# Patient Record
Sex: FEMALE | Race: WHITE | NOT HISPANIC OR LATINO | Employment: STUDENT | RURAL
[De-identification: names, ages, dates, MRNs, and addresses within clinical notes are randomized per-mention and may not be internally consistent; named-entity substitution may affect disease eponyms.]

---
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
## 2023-08-22 ENCOUNTER — OFFICE VISIT (OUTPATIENT)
Dept: FAMILY MEDICINE CLINIC | Facility: CLINIC | Age: 18
End: 2023-08-22
Payer: COMMERCIAL

## 2023-08-22 VITALS
DIASTOLIC BLOOD PRESSURE: 70 MMHG | SYSTOLIC BLOOD PRESSURE: 122 MMHG | WEIGHT: 217 LBS | HEIGHT: 67 IN | OXYGEN SATURATION: 98 % | HEART RATE: 52 BPM | BODY MASS INDEX: 34.06 KG/M2

## 2023-08-22 DIAGNOSIS — R10.2 PELVIC PAIN: Primary | ICD-10-CM

## 2023-08-22 DIAGNOSIS — R10.13 EPIGASTRIC PAIN: ICD-10-CM

## 2023-08-22 DIAGNOSIS — H66.91 ACUTE OTITIS MEDIA, RIGHT: ICD-10-CM

## 2023-08-22 PROCEDURE — 99213 OFFICE O/P EST LOW 20 MIN: CPT | Performed by: STUDENT IN AN ORGANIZED HEALTH CARE EDUCATION/TRAINING PROGRAM

## 2023-08-22 RX ORDER — AMOXICILLIN 875 MG/1
875 TABLET, COATED ORAL 2 TIMES DAILY
Qty: 20 TABLET | Refills: 0 | Status: SHIPPED | OUTPATIENT
Start: 2023-08-22 | End: 2023-09-01

## 2023-08-22 RX ORDER — OMEPRAZOLE 40 MG/1
40 CAPSULE, DELAYED RELEASE ORAL DAILY
Qty: 90 CAPSULE | Refills: 1 | Status: SHIPPED | OUTPATIENT
Start: 2023-08-22

## 2023-08-22 NOTE — PROGRESS NOTES
"Chief Complaint  Abdominal Pain (Lower abdominal pain, nausea and vomiting x 1 month) and Earache    Subjective          Pallavi Oliver presents to [facility]  History of Present Illness    Patient states that for the past month or so she has been having more trouble with her stomach. She states that her pain is in the low abdomen as well as in the epigastric region. She states that she is sexually active and her last period was 8/15/23. She states that she is not actively preventing pregnancy at this time. She has not tried anything over the counter to help with her symptoms.    She also thinks that she has been  having trouble with her ear recently. She states that it has been bothering her since Friday. She has no other symptoms that she is aware of. No exposure to illness that she is aware of.           Objective   Vital Signs:   /70 (BP Location: Left arm, Patient Position: Sitting, Cuff Size: Adult)   Pulse 52   Ht 170.2 cm (67\")   Wt 98.4 kg (217 lb)   SpO2 98%   BMI 33.99 kg/mý     Body mass index is 33.99 kg/mý.    Review of Systems    Past History:  Medical History: has no past medical history on file.   Surgical History: has no past surgical history on file.   Family History: family history includes ADD / ADHD in an other family member; Asthma in an other family member; Hyperlipidemia in an other family member; Hypertension in an other family member; Migraines in her paternal grandmother.   Social History: reports that she has never smoked. She has never used smokeless tobacco. Alcohol use questions deferred to the physician. Drug use questions deferred to the physician.      Current Outpatient Medications:     amoxicillin (AMOXIL) 875 MG tablet, Take 1 tablet by mouth 2 (Two) Times a Day for 10 days., Disp: 20 tablet, Rfl: 0    omeprazole (priLOSEC) 40 MG capsule, Take 1 capsule by mouth Daily., Disp: 90 capsule, Rfl: 1    Allergies: Patient has no known " allergies.    Physical Exam  Constitutional:       General: She is not in acute distress.     Appearance: She is not ill-appearing or toxic-appearing.   HENT:      Head: Normocephalic and atraumatic.      Ears:      Comments: Otitis media of the right ear  Cardiovascular:      Rate and Rhythm: Normal rate and regular rhythm.      Heart sounds: No murmur heard.  Pulmonary:      Effort: Pulmonary effort is normal. No respiratory distress.   Abdominal:      Tenderness: There is no guarding or rebound.      Comments: Generalized abdominal pain, blood abnormality in left lower quadrant overlying ovarian area.   Neurological:      General: No focal deficit present.      Mental Status: She is alert and oriented to person, place, and time.   Psychiatric:         Mood and Affect: Mood normal.         Thought Content: Thought content normal.        Result Review :                   Assessment and Plan    Diagnoses and all orders for this visit:    1. Pelvic pain (Primary)  -     US Pelvis Complete; Future    2. Epigastric pain  -     omeprazole (priLOSEC) 40 MG capsule; Take 1 capsule by mouth Daily.  Dispense: 90 capsule; Refill: 1    3. Acute otitis media, right  -     amoxicillin (AMOXIL) 875 MG tablet; Take 1 tablet by mouth 2 (Two) Times a Day for 10 days.  Dispense: 20 tablet; Refill: 0    Patient with possible ovarian cyst.  Will send for ultrasound to evaluate this further.    We will start omeprazole to help with nausea and vomiting first thing in the morning which sound to be related to increased acid and reflux.    Amoxicillin for the otitis media.  Call with any new or worsening symptoms.    Follow Up   No follow-ups on file.  Patient was given instructions and counseling regarding her condition or for health maintenance advice. Please see specific information pulled into the AVS if appropriate.     Linda Valentin, DO

## 2023-08-31 ENCOUNTER — HOSPITAL ENCOUNTER (OUTPATIENT)
Dept: ULTRASOUND IMAGING | Facility: HOSPITAL | Age: 18
Discharge: HOME | End: 2023-08-31
Admitting: STUDENT IN AN ORGANIZED HEALTH CARE EDUCATION/TRAINING PROGRAM
Payer: COMMERCIAL

## 2023-08-31 DIAGNOSIS — R10.2 PELVIC PAIN: ICD-10-CM

## 2023-08-31 PROCEDURE — 76856 US EXAM PELVIC COMPLETE: CPT

## 2023-09-06 ENCOUNTER — PATIENT MESSAGE (OUTPATIENT)
Dept: FAMILY MEDICINE CLINIC | Facility: CLINIC | Age: 18
End: 2023-09-06
Payer: COMMERCIAL

## 2023-09-13 ENCOUNTER — OFFICE VISIT (OUTPATIENT)
Dept: FAMILY MEDICINE CLINIC | Facility: CLINIC | Age: 18
End: 2023-09-13
Payer: COMMERCIAL

## 2023-09-13 VITALS
WEIGHT: 215 LBS | HEIGHT: 67 IN | BODY MASS INDEX: 33.74 KG/M2 | SYSTOLIC BLOOD PRESSURE: 124 MMHG | OXYGEN SATURATION: 100 % | HEART RATE: 70 BPM | DIASTOLIC BLOOD PRESSURE: 70 MMHG

## 2023-09-13 DIAGNOSIS — R53.83 OTHER FATIGUE: ICD-10-CM

## 2023-09-13 DIAGNOSIS — K21.9 GASTROESOPHAGEAL REFLUX DISEASE, UNSPECIFIED WHETHER ESOPHAGITIS PRESENT: ICD-10-CM

## 2023-09-13 DIAGNOSIS — K92.1 MELENA: Primary | ICD-10-CM

## 2023-09-13 PROCEDURE — 99213 OFFICE O/P EST LOW 20 MIN: CPT | Performed by: STUDENT IN AN ORGANIZED HEALTH CARE EDUCATION/TRAINING PROGRAM

## 2023-09-13 NOTE — PROGRESS NOTES
"Chief Complaint  Vomiting and Abdominal Pain    Subjective          Pallavi Carmona presents to [facility]  History of Present Illness    Patient presents the office today for follow-up on abdominal pain.  She states that it did initially get better, and she does not have as much difficulty with morning nausea and vomiting that she did previously, but she now has been having persistent left lower quadrant abdominal pain which is colicky in nature, and has been having episodes of blood in stool as well as abnormal colors to her stool and mucus.  She states that she has never had anything like this before.  She does not have any known family history of irritable bowel disease.    Objective   Vital Signs:   /70   Pulse 70   Ht 170.2 cm (67\")   Wt 97.5 kg (215 lb)   SpO2 100%   BMI 33.67 kg/m²     Body mass index is 33.67 kg/m².    Review of Systems    Past History:  Medical History: has no past medical history on file.   Surgical History: has no past surgical history on file.   Family History: family history includes ADD / ADHD in an other family member; Asthma in an other family member; Hyperlipidemia in an other family member; Hypertension in an other family member; Migraines in her paternal grandmother.   Social History: reports that she has never smoked. She has never used smokeless tobacco. Alcohol use questions deferred to the physician. Drug use questions deferred to the physician.      Current Outpatient Medications:     omeprazole (priLOSEC) 40 MG capsule, Take 1 capsule by mouth Daily., Disp: 90 capsule, Rfl: 1    Allergies: Patient has no known allergies.    Physical Exam  Constitutional:       General: She is not in acute distress.     Appearance: She is not ill-appearing or toxic-appearing.   HENT:      Head: Normocephalic and atraumatic.   Cardiovascular:      Rate and Rhythm: Normal rate and regular rhythm.      Heart sounds: No murmur heard.  Pulmonary:      Effort: " Pulmonary effort is normal. No respiratory distress.   Abdominal:      Tenderness: There is abdominal tenderness (Left lower quadrant).   Neurological:      General: No focal deficit present.      Mental Status: She is alert and oriented to person, place, and time.   Psychiatric:         Mood and Affect: Mood normal.         Thought Content: Thought content normal.        Result Review :                   Assessment and Plan    Diagnoses and all orders for this visit:    1. Melena (Primary)  -     Ambulatory Referral to Gastroenterology  -     Comprehensive Metabolic Panel  -     CBC & Differential    2. Gastroesophageal reflux disease, unspecified whether esophagitis present  -     Ambulatory Referral to Gastroenterology  -     TSH  -     T4, Free    3. Other fatigue  -     Comprehensive Metabolic Panel  -     CBC & Differential  -     TSH  -     T4, Free    Recorded today and will contact patient with results available.    Because of the change abdominal pain and possible melena as well as mucus in stool we will send to GI for further evaluation.  He is agreeable to this.  Continue omeprazole at this time.    Follow Up   No follow-ups on file.  Patient was given instructions and counseling regarding her condition or for health maintenance advice. Please see specific information pulled into the AVS if appropriate.     Linda Valentin, DO

## 2023-09-14 LAB
ALBUMIN SERPL-MCNC: 4.7 G/DL (ref 4–5)
ALBUMIN/GLOB SERPL: 1.7 {RATIO} (ref 1.2–2.2)
ALP SERPL-CCNC: 94 IU/L (ref 42–106)
ALT SERPL-CCNC: 17 IU/L (ref 0–32)
AST SERPL-CCNC: 15 IU/L (ref 0–40)
BASOPHILS # BLD AUTO: 0.1 X10E3/UL (ref 0–0.2)
BASOPHILS NFR BLD AUTO: 1 %
BILIRUB SERPL-MCNC: 0.3 MG/DL (ref 0–1.2)
BUN SERPL-MCNC: 8 MG/DL (ref 6–20)
BUN/CREAT SERPL: 11 (ref 9–23)
CALCIUM SERPL-MCNC: 9.3 MG/DL (ref 8.7–10.2)
CHLORIDE SERPL-SCNC: 103 MMOL/L (ref 96–106)
CO2 SERPL-SCNC: 17 MMOL/L (ref 20–29)
CREAT SERPL-MCNC: 0.74 MG/DL (ref 0.57–1)
EGFRCR SERPLBLD CKD-EPI 2021: 119 ML/MIN/1.73
EOSINOPHIL # BLD AUTO: 0.1 X10E3/UL (ref 0–0.4)
EOSINOPHIL NFR BLD AUTO: 1 %
ERYTHROCYTE [DISTWIDTH] IN BLOOD BY AUTOMATED COUNT: 13.2 % (ref 11.7–15.4)
GLOBULIN SER CALC-MCNC: 2.8 G/DL (ref 1.5–4.5)
GLUCOSE SERPL-MCNC: 99 MG/DL (ref 70–99)
HCT VFR BLD AUTO: 41.9 % (ref 34–46.6)
HGB BLD-MCNC: 13.6 G/DL (ref 11.1–15.9)
IMM GRANULOCYTES # BLD AUTO: 0 X10E3/UL (ref 0–0.1)
IMM GRANULOCYTES NFR BLD AUTO: 0 %
LYMPHOCYTES # BLD AUTO: 2.2 X10E3/UL (ref 0.7–3.1)
LYMPHOCYTES NFR BLD AUTO: 30 %
MCH RBC QN AUTO: 27.9 PG (ref 26.6–33)
MCHC RBC AUTO-ENTMCNC: 32.5 G/DL (ref 31.5–35.7)
MCV RBC AUTO: 86 FL (ref 79–97)
MONOCYTES # BLD AUTO: 0.6 X10E3/UL (ref 0.1–0.9)
MONOCYTES NFR BLD AUTO: 8 %
NEUTROPHILS # BLD AUTO: 4.4 X10E3/UL (ref 1.4–7)
NEUTROPHILS NFR BLD AUTO: 60 %
PLATELET # BLD AUTO: 345 X10E3/UL (ref 150–450)
POTASSIUM SERPL-SCNC: 4.6 MMOL/L (ref 3.5–5.2)
PROT SERPL-MCNC: 7.5 G/DL (ref 6–8.5)
RBC # BLD AUTO: 4.88 X10E6/UL (ref 3.77–5.28)
SODIUM SERPL-SCNC: 141 MMOL/L (ref 134–144)
T4 FREE SERPL-MCNC: 1.26 NG/DL (ref 0.82–1.77)
TSH SERPL DL<=0.005 MIU/L-ACNC: 0.75 UIU/ML (ref 0.45–4.5)
WBC # BLD AUTO: 7.3 X10E3/UL (ref 3.4–10.8)

## 2023-09-25 ENCOUNTER — OFFICE VISIT (OUTPATIENT)
Dept: GASTROENTEROLOGY | Facility: CLINIC | Age: 18
End: 2023-09-25

## 2023-09-25 VITALS
TEMPERATURE: 98.4 F | DIASTOLIC BLOOD PRESSURE: 82 MMHG | RESPIRATION RATE: 18 BRPM | SYSTOLIC BLOOD PRESSURE: 140 MMHG | HEART RATE: 78 BPM | OXYGEN SATURATION: 99 % | BODY MASS INDEX: 34.06 KG/M2 | WEIGHT: 217 LBS | HEIGHT: 67 IN

## 2023-09-25 DIAGNOSIS — R10.31 RIGHT LOWER QUADRANT PAIN: ICD-10-CM

## 2023-09-25 DIAGNOSIS — R10.32 LEFT LOWER QUADRANT PAIN: ICD-10-CM

## 2023-09-25 DIAGNOSIS — K65.1 RIGHT UPPER QUADRANT ABDOMINAL ABSCESS: ICD-10-CM

## 2023-09-25 DIAGNOSIS — R19.4 CHANGE IN BOWEL HABITS: ICD-10-CM

## 2023-09-25 DIAGNOSIS — K62.5 RECTAL BLEEDING: Primary | ICD-10-CM

## 2023-09-25 PROCEDURE — 99204 OFFICE O/P NEW MOD 45 MIN: CPT | Performed by: INTERNAL MEDICINE

## 2023-09-25 NOTE — PROGRESS NOTES
New Patient Consultation     Patient Name: Pallavi Carmona  : 2005   MRN: [id number]     Chief Complaint   Patient presents with    Diarrhea    RLQ Pain    Nausea    Black or Bloody Stool    Occasion right sided upper pain    History of Present Illness: Pallavi Carmona is a 18 y.o. female, PMH includes no significant hx, who is here today for a Gastroenterology Consultation for bloody stool and lower quadrant pain;  Patient is meeting me for the first time.  Patient had pelvic ultrasound that was negative.  She was started  on omeprazole for possible n/v;  Patient did complete course of amoxicillin for otitis media August as well with PCP.  Patient completed the antibiotics.      Patient on prilosec and denies any further vomiting.      Patient has about 5 type 6 bristol stools.  This is very unusual for the patient.  Patient feels like she has to go constantly.    Patient reports epigastric and lower abdominal pain.  She has reported blood in her stool.  Per PCP note she had melena.    Labs were drawn and she is not anemic.    She has been having symptoms for about 1 month.    Patient reports prilosec.  This has helped.    Patient started having blood in stool three weeks ago.  Patient was light red.  Light red  color mixed in stool after defecation.  Patient had abdominal pain after passing the stool. Blood not with every BM.  Patient sees blood in stool every other day.      Patient has pain on the right lower quadrant.  Patient reports comes and goes.  Patient reports with defecation has more pain on the left side.      Patient reports pain worse with eating.  20 to 30 minutes has to have a bowel movement with urgency.  Patient denies incontinence.  Patient denies passage of mucous.      Baseline, good appetite and no GI troubles.      Mild nausea in morning and no vomiting.    She denies daily NSAIDS.  Patient reports issues are separate from migraines    Patient denies personal or FHx of PUD, H  Pylori, gastritis, pancreatitis, colitis, Celiac disease, UC, Crohn's disease, IBS, colon or gastric cancers. Pt denies EtOH, tobacco, illicit substance or NSAID use.    Last EGD: none  Last Colon: none    Subjective      Review of Systems   Constitutional:  Positive for appetite change.   HENT: Negative.     Eyes: Negative.    Respiratory: Negative.     Cardiovascular: Negative.    Gastrointestinal:  Positive for blood in stool and diarrhea.   Endocrine: Negative.    Genitourinary: Negative.    Musculoskeletal: Negative.    Allergic/Immunologic: Negative.    Neurological: Negative.    Hematological: Negative.    Psychiatric/Behavioral: Negative.       No past medical history on file.    No past surgical history on file.    Family History   Problem Relation Age of Onset    Migraines Paternal Grandmother     Asthma Other     Hypertension Other     Hyperlipidemia Other     ADD / ADHD Other        Social History     Socioeconomic History    Marital status: Single   Tobacco Use    Smoking status: Never    Smokeless tobacco: Never   Vaping Use    Vaping Use: Every day   Substance and Sexual Activity    Alcohol use: Defer    Drug use: Defer    Sexual activity: Defer       Social History     Substance and Sexual Activity   Alcohol Use Defer     Social History     Tobacco Use   Smoking Status Never   Smokeless Tobacco Never     Has 2 and 3 years old at [facility]    Current Outpatient Medications:     omeprazole (priLOSEC) 40 MG capsule, Take 1 capsule by mouth Daily., Disp: 90 capsule, Rfl: 1    No Known Allergies    Objective     Physical Exam:  There were no vitals filed for this visit.  There is no height or weight on file to calculate BMI.     Physical Exam  Constitutional:       Appearance: Normal appearance.   HENT:      Head: Normocephalic.      Nose: Nose normal.   Cardiovascular:      Rate and Rhythm: Normal rate.   Pulmonary:      Effort: Pulmonary effort is normal.      Breath sounds: Normal breath sounds.    Abdominal:      General: Abdomen is flat. Bowel sounds are normal.   Musculoskeletal:         General: Normal range of motion.      Cervical back: Normal range of motion.   Skin:     General: Skin is warm.   Neurological:      General: No focal deficit present.      Mental Status: She is alert. Mental status is at baseline.   Psychiatric:         Mood and Affect: Mood normal.       Assessment / Plan      1. Rectal bleeding  Intermittent, differential include colitis, mass, internal hemorrhoids;  noted NO anemia.  Patient hemodynamically stable.  Noted issues worse with eating.  Reviewed pelvic ultrasound and labs by referring physician  - Ambulatory referral for Screening EGD  - Ambulatory Referral For Screening Colonoscopy    2. Left lower quadrant pain    - Ambulatory referral for Screening EGD  - Ambulatory Referral For Screening Colonoscopy    3. Right lower quadrant pain  - Ambulatory Referral For Screening Colonoscopy    4. Right upper quadrant abdominal abscess  - US Abdomen Complete; Future  - Consider GB issues but again the bleeding inconsistent with this.    5. Change in bowel habits  The change predates the use of antibiotics.  Patient may have colitis or internal hemorrhoids.    EGD and colonoscopy    Case discussed with patient and mother stated much of her childhood history.      Follow Up:  after procedures and ultrasound      Plan of care reviewed with the patient at the conclusion of today's visit.  Education was provided regarding diagnosis, management, and any prescribed or recommended OTC medications.  Patient verbalized understanding of and agreement with management plan.     NOTE TO PATIENT: The 21st Century Cures Act makes medical notes like these available to patients in the interest of transparency. However, be advised this is a medical document. It is intended as peer to peer communication. It is written in medical language and may contain abbreviations or verbiage that are unfamiliar.  It may appear blunt or direct. Medical documents are intended to carry relevant information, facts as evident, and the clinical opinion of the practitioner.     Aan Styles MD   [facility]    Part of this note may be an electronic transcription/translation of spoken language to printed text using the Dragon Dictation System.

## 2023-10-09 ENCOUNTER — TELEPHONE (OUTPATIENT)
Dept: GASTROENTEROLOGY | Facility: CLINIC | Age: 18
End: 2023-10-09
Payer: COMMERCIAL

## 2023-10-09 NOTE — TELEPHONE ENCOUNTER
Caller: Pallavi Carmona    Relationship to patient: Self    Best call back number: [phone]     Type of visit: COLONOSCOPY/EGD     Requested date: AFTER  10/21     If rescheduling, when is the original appointment: 10/13     Additional notes: PATIENT IS SCHEDULED FOR A PROCEDURE WITH DR LEGGETT ON 10/13 THEY WOULD LIKE TO RESCHEDULE FOR AFTER 10/21. PLEASE CALL PATIENT.

## 2023-10-18 DIAGNOSIS — R10.84 GENERALIZED ABDOMINAL PAIN: Primary | ICD-10-CM

## 2024-01-05 ENCOUNTER — HOSPITAL ENCOUNTER (OUTPATIENT)
Dept: NUCLEAR MEDICINE | Facility: HOSPITAL | Age: 19
Discharge: HOME | End: 2024-01-05
Payer: COMMERCIAL

## 2024-01-05 VITALS — WEIGHT: 200 LBS | BODY MASS INDEX: 31.32 KG/M2

## 2024-01-05 DIAGNOSIS — R10.84 GENERALIZED ABDOMINAL PAIN: ICD-10-CM

## 2024-01-05 PROCEDURE — 0 TECHNETIUM TC 99M MEBROFENIN KIT: Performed by: INTERNAL MEDICINE

## 2024-01-05 PROCEDURE — 78227 HEPATOBIL SYST IMAGE W/DRUG: CPT

## 2024-01-05 PROCEDURE — A9537 TC99M MEBROFENIN: HCPCS | Performed by: INTERNAL MEDICINE

## 2024-01-05 PROCEDURE — 25010000002 SINCALIDE PER 5 MCG: Performed by: INTERNAL MEDICINE

## 2024-01-05 RX ORDER — SINCALIDE 5 UG/5ML
0.02 INJECTION, POWDER, LYOPHILIZED, FOR SOLUTION INTRAVENOUS ONCE
Status: COMPLETED | OUTPATIENT
Start: 2024-01-05 | End: 2024-01-05

## 2024-01-05 RX ORDER — KIT FOR THE PREPARATION OF TECHNETIUM TC 99M MEBROFENIN 45 MG/10ML
1 INJECTION, POWDER, LYOPHILIZED, FOR SOLUTION INTRAVENOUS
Status: COMPLETED | OUTPATIENT
Start: 2024-01-05 | End: 2024-01-05

## 2024-01-05 RX ADMIN — MEBROFENIN 1 DOSE: 45 INJECTION, POWDER, LYOPHILIZED, FOR SOLUTION INTRAVENOUS at 12:25

## 2024-01-05 RX ADMIN — SINCALIDE 1.8 MCG: 5 INJECTION, POWDER, LYOPHILIZED, FOR SOLUTION INTRAVENOUS at 13:42

## 2024-01-06 DIAGNOSIS — R10.11 RIGHT UPPER QUADRANT ABDOMINAL PAIN: Primary | ICD-10-CM

## 2024-01-30 ENCOUNTER — LAB REQUISITION (OUTPATIENT)
Dept: LAB | Facility: HOSPITAL | Age: 19
End: 2024-01-30
Payer: COMMERCIAL

## 2024-01-30 DIAGNOSIS — K82.8 BILIARY DYSKINESIA: Primary | ICD-10-CM

## 2024-01-30 DIAGNOSIS — K82.8 OTHER SPECIFIED DISEASES OF GALLBLADDER: ICD-10-CM

## 2024-01-30 PROCEDURE — 88304 TISSUE EXAM BY PATHOLOGIST: CPT | Performed by: STUDENT IN AN ORGANIZED HEALTH CARE EDUCATION/TRAINING PROGRAM

## 2024-01-30 RX ORDER — TRAMADOL HYDROCHLORIDE 50 MG/1
50 TABLET ORAL EVERY 6 HOURS PRN
Qty: 10 TABLET | Refills: 0 | Status: SHIPPED | OUTPATIENT
Start: 2024-01-30

## 2024-01-31 LAB
CYTO UR: NORMAL
LAB AP CASE REPORT: NORMAL
LAB AP CLINICAL INFORMATION: NORMAL
PATH REPORT.FINAL DX SPEC: NORMAL
PATH REPORT.GROSS SPEC: NORMAL

## 2025-04-14 ENCOUNTER — TELEPHONE (OUTPATIENT)
Dept: OBSTETRICS AND GYNECOLOGY | Facility: CLINIC | Age: 20
End: 2025-04-14

## 2025-04-14 NOTE — TELEPHONE ENCOUNTER
Hub staff attempted to follow warm transfer process and was unsuccessful     Caller: Pallavi Carmona    Relationship to patient: Self    Best call back number: [phone]; OK TO LVM     Patient is needing: NEW OB, NO PREF FOR PROVIDER. LMP 03/06/2025

## 2025-04-21 ENCOUNTER — TELEPHONE (OUTPATIENT)
Dept: OBSTETRICS AND GYNECOLOGY | Facility: CLINIC | Age: 20
End: 2025-04-21
Payer: COMMERCIAL

## 2025-04-21 NOTE — TELEPHONE ENCOUNTER
S/w pt she states she has been having nausea and mild vomiting and wanted to know if we could send her in something for this.     Patient states she has not tried unisom or vitamin b6 OTC.     I advised patient to try OTC unisom, vitamin b6, bland diet, small frequent meals, and if that does not help to CB for further evaluation.     She v/u     Patient denies: vomiting >5x per day, vaginal bleeding, HA's, vision changes, severe pelvic pain/cramping

## 2025-04-21 NOTE — TELEPHONE ENCOUNTER
Pt called and stated she has a new ob appt scheduled in office for early may but pt states that she is having extreme morning sickness and would like to know if we can send a script in for her

## 2025-04-30 DIAGNOSIS — Z34.90 EARLY STAGE OF PREGNANCY: Primary | ICD-10-CM

## 2025-05-02 ENCOUNTER — INITIAL PRENATAL (OUTPATIENT)
Dept: OBSTETRICS AND GYNECOLOGY | Facility: CLINIC | Age: 20
End: 2025-05-02
Payer: COMMERCIAL

## 2025-05-02 VITALS — WEIGHT: 187.6 LBS | DIASTOLIC BLOOD PRESSURE: 80 MMHG | SYSTOLIC BLOOD PRESSURE: 126 MMHG | BODY MASS INDEX: 29.38 KG/M2

## 2025-05-02 DIAGNOSIS — O26.891 PREGNANCY HEADACHE IN FIRST TRIMESTER: ICD-10-CM

## 2025-05-02 DIAGNOSIS — R51.9 PREGNANCY HEADACHE IN FIRST TRIMESTER: ICD-10-CM

## 2025-05-02 DIAGNOSIS — Z34.00 PRENATAL CARE, FIRST PREGNANCY, ANTEPARTUM: Primary | ICD-10-CM

## 2025-05-02 PROBLEM — O26.899 HEADACHE IN PREGNANCY: Status: ACTIVE | Noted: 2025-05-02

## 2025-05-02 RX ORDER — LANOLIN ALCOHOL/MO/W.PET/CERES
50 CREAM (GRAM) TOPICAL DAILY
COMMUNITY

## 2025-05-02 RX ORDER — METOCLOPRAMIDE 10 MG/1
10 TABLET ORAL EVERY 6 HOURS PRN
Qty: 90 TABLET | Refills: 1 | Status: SHIPPED | OUTPATIENT
Start: 2025-05-02

## 2025-05-02 RX ORDER — DIPHENHYDRAMINE HCL 25 MG
25 TABLET ORAL EVERY 6 HOURS PRN
Qty: 90 TABLET | Refills: 1 | Status: SHIPPED | OUTPATIENT
Start: 2025-05-02

## 2025-05-02 NOTE — PROGRESS NOTES
Initial ob visit     CC- Here for care of pregnancy        Pallavi Carmona is a 19 y.o. female, , who presents for her first obstetrical visit.  Patient's last menstrual period was 2025 (exact date).. Her HENNA is 2025, by Last Menstrual Period. Current GA is 8w1d.     She states she seen [facility] 2024 and she was told she had a chemical pregnancy at that time. She states they were going to test her for PCOS d/t facial hair, weight in her abdomen, and ovarian cyst. However, she never had the testing done. Works in childcare.    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     Initial positive test date : 25, UPT        Her periods are every 28 days.  Prior obstetric issues: none  Patient's past medical history is significant for:  none .  Family history of genetic issues (includes FOB): none  Prior infections concerning in pregnancy (Rash, fever in last 2 weeks): No  Varicella Hx - vaccinated  Prior testing for Cystic Fibrosis Carrier or Sickle Cell Trait- no.   Prepregnancy BMI - Body mass index is 29.38 kg/m².  History of STD: no  Hx of HSV for patient or partner: no  Ultrasound Today: yes    OB History    Para Term  AB Living   1        SAB IAB Ectopic Molar Multiple Live Births              # Outcome Date GA Lbr Ramsey/2nd Weight Sex Type Anes PTL Lv   1 Current                Additional Pertinent History   Last Pap : 2024-Last Pap- [facility]- WNL-per patient     Last Completed Pap Smear    This patient has no relevant Health Maintenance data.       History of abnormal Pap smear: no  Family history of uterine, colon, breast, or ovarian cancer: no  Feelings of Anxiety or Depression: no  Tobacco Usage?: No   Alcohol/Drug Use?: NO  Over the age of 35 at delivery: no  Genetic Screening: Rn will discuss  High Risk Moderate Risk Low Risk   History of preeclampsia or gestational hypertension- No  Multifetal gestation- No  Chronic hypertension-  No  Pregestational Diabetes- No  Kidney Disease- No  Autoimmune disease (I.e. lupus, antiphospholipid syndrome)- No Nulliparity- Yes  Obesity (BMI>30)- borderline  Family history of preeclampsia- No  Black race- No  Lower socio-economic status- No  Age 35+ - No  IVF- No  History of SGA Infant- No  >10 year interpregnancy interval- No Prior uncomplicated term delivery and absence of any risk factors- No   Total: 0 Total: 1-2 Total: 0      For this patient, low dose aspirin is indicated due to 2 or more moderate risk factors and aspirin 81mg has been prescribed.     PMH    Current Outpatient Medications:     doxylamine (UNISOM) 25 MG tablet, Take 1 tablet by mouth At Night As Needed for Sleep., Disp: , Rfl:     omeprazole (priLOSEC) 40 MG capsule, Take 1 capsule by mouth Daily., Disp: 90 capsule, Rfl: 1    vitamin B-6 (PYRIDOXINE) 50 MG tablet, Take 1 tablet by mouth Daily., Disp: , Rfl:     diphenhydrAMINE (Benadryl Allergy) 25 MG tablet, Take 1 tablet by mouth Every 6 (Six) Hours As Needed (headache [take with reglan])., Disp: 90 tablet, Rfl: 1    metoclopramide (Reglan) 10 MG tablet, Take 1 tablet by mouth Every 6 (Six) Hours As Needed (headache [take with benadryl])., Disp: 90 tablet, Rfl: 1     Past Medical History:   Diagnosis Date    Acid reflux     Migraine     Ovarian cyst         Past Surgical History:   Procedure Laterality Date    LAPAROSCOPIC CHOLECYSTECTOMY  01/30/2024    TONSILLECTOMY      TYMPANOPLASTY         Review of Systems   Review of Systems    Patient Reports:  none  Patient Denies:excessive nausea , excessive vomiting, and vaginal bleeding  All systems reviewed and otherwise normal.    I have reviewed and agree with the HPI, ROS, and historical information as entered above. Johnny Meyer MD     /80   Wt 85.1 kg (187 lb 9.6 oz)   LMP 03/06/2025 (Exact Date)   BMI 29.38 kg/m²     The additional following portions of the patient's history were reviewed and updated as appropriate:  allergies, current medications, past family history, past medical history, past social history, past surgical history, and problem list.    Physical Exam  General:  well developed; well nourished  no acute distress  mentation appropriate   Chest/Respiratory: unlabored   Heart:  not examined   Thyroid: not examined   Breasts:  Not performed.   Abdomen: Not performed.   Pelvis: Not performed.        Assessment and Plan    Problem List Items Addressed This Visit       Headache in pregnancy    Relevant Medications    diphenhydrAMINE (Benadryl Allergy) 25 MG tablet    metoclopramide (Reglan) 10 MG tablet     Other Visit Diagnoses         Prenatal care, first pregnancy, antepartum    -  Primary    Relevant Orders    Obstetric Panel    HIV-1 / O / 2 Ag / Antibody    Urine Culture - Urine, Urine, Clean Catch    Urinalysis With Microscopic - Urine, Clean Catch    Chlamydia trachomatis, Neisseria gonorrhoeae, PCR - Urine, Urine, Clean Catch    Urine Drug Screen - Urine, Clean Catch    Thyroid Cascade Profile    Parvovirus B19 Antibody, IgG    Varicella Zoster Antibody, IgG    Hemoglobin A1c            Pregnancy at 8w1d  Reviewed routine prenatal care with the office and educational materials given  Lab(s) Ordered  Discussed options for genetic testing including first trimester nuchal translucency screen, genetic disease carrier testing, quadruple screen, and NIPT  Medication(s) Ordered  Discontinue the use of all non-medicinal drugs and chemicals  Nausea/Vomiting - she does not desire medications at this time.  Discussed conservative ways to help with nausea.  Patient is on Prenatal vitamins  Activity recommendation : 150 minutes/week of moderate intensity aerobic activity unless we limit for bleeding, hypertension or other pregnancy complication   U/S ordered at follow up  Recommend limiting weight gain to 15 to 20 pounds in pregnancy.   Discussed carbohydrate control.   hgb A1C today  discussed baby aspirin from 12 weeks to  delivery for prevention of preeclampsia   Return in about 4 weeks (around 5/30/2025) for Prenatal Care.      Johnny Meyer MD  05/02/2025

## 2025-05-03 LAB
ABO GROUP BLD: ABNORMAL
APPEARANCE UR: ABNORMAL
BACTERIA #/AREA URNS HPF: ABNORMAL /[HPF]
BASOPHILS # BLD AUTO: 0 X10E3/UL (ref 0–0.2)
BASOPHILS NFR BLD AUTO: 0 %
BILIRUB UR QL STRIP: NEGATIVE
BLD GP AB SCN SERPL QL: NEGATIVE
CASTS URNS QL MICRO: ABNORMAL /LPF
COLOR UR: YELLOW
EOSINOPHIL # BLD AUTO: 0 X10E3/UL (ref 0–0.4)
EOSINOPHIL NFR BLD AUTO: 0 %
EPI CELLS #/AREA URNS HPF: >10 /HPF (ref 0–10)
ERYTHROCYTE [DISTWIDTH] IN BLOOD BY AUTOMATED COUNT: 12.7 % (ref 11.7–15.4)
GLUCOSE UR QL STRIP: NEGATIVE
HBV SURFACE AG SERPL QL IA: NEGATIVE
HCT VFR BLD AUTO: 40.7 % (ref 34–46.6)
HCV IGG SERPL QL IA: NON REACTIVE
HGB BLD-MCNC: 13.7 G/DL (ref 11.1–15.9)
HGB UR QL STRIP: NEGATIVE
HIV 1+2 AB+HIV1 P24 AG SERPL QL IA: NON REACTIVE
IMM GRANULOCYTES # BLD AUTO: 0 X10E3/UL (ref 0–0.1)
IMM GRANULOCYTES NFR BLD AUTO: 0 %
INTERPRETATION: NORMAL
KETONES UR QL STRIP: NEGATIVE
LEUKOCYTE ESTERASE UR QL STRIP: NEGATIVE
LYMPHOCYTES # BLD AUTO: 2.1 X10E3/UL (ref 0.7–3.1)
LYMPHOCYTES NFR BLD AUTO: 20 %
MCH RBC QN AUTO: 29.7 PG (ref 26.6–33)
MCHC RBC AUTO-ENTMCNC: 33.7 G/DL (ref 31.5–35.7)
MCV RBC AUTO: 88 FL (ref 79–97)
MICRO URNS: ABNORMAL
MICRO URNS: ABNORMAL
MONOCYTES # BLD AUTO: 0.7 X10E3/UL (ref 0.1–0.9)
MONOCYTES NFR BLD AUTO: 6 %
NEUTROPHILS # BLD AUTO: 7.6 X10E3/UL (ref 1.4–7)
NEUTROPHILS NFR BLD AUTO: 74 %
NITRITE UR QL STRIP: NEGATIVE
PH UR STRIP: 6 [PH] (ref 5–7.5)
PLATELET # BLD AUTO: 267 X10E3/UL (ref 150–450)
PROT UR QL STRIP: NEGATIVE
RBC # BLD AUTO: 4.61 X10E6/UL (ref 3.77–5.28)
RBC #/AREA URNS HPF: ABNORMAL /HPF (ref 0–2)
RH BLD: POSITIVE
RPR SER QL: NON REACTIVE
RUBV IGG SERPL IA-ACNC: 6.15 INDEX
SP GR UR STRIP: 1.02 (ref 1–1.03)
T3FREE SERPL-MCNC: 3.9 PG/ML (ref 2–4.4)
T4 FREE SERPL-MCNC: 1.3 NG/DL (ref 0.82–1.77)
TSH SERPL DL<=0.005 MIU/L-ACNC: 0.12 UIU/ML (ref 0.45–4.5)
UROBILINOGEN UR STRIP-MCNC: 0.2 MG/DL (ref 0.2–1)
VZV IGG SER QL IA: REACTIVE
WBC # BLD AUTO: 10.4 X10E3/UL (ref 3.4–10.8)
WBC #/AREA URNS HPF: ABNORMAL /HPF (ref 0–5)

## 2025-05-04 LAB
BACTERIA UR CULT: NORMAL
BACTERIA UR CULT: NORMAL
HBA1C MFR BLD: 5.2 % (ref 4.8–5.6)
SPECIMEN STATUS: NORMAL

## 2025-05-05 LAB
C TRACH RRNA SPEC QL NAA+PROBE: NEGATIVE
N GONORRHOEA RRNA SPEC QL NAA+PROBE: NEGATIVE

## 2025-05-06 LAB — B19V IGG SER IA-ACNC: 5.3 INDEX (ref 0–0.8)

## 2025-06-02 ENCOUNTER — ROUTINE PRENATAL (OUTPATIENT)
Dept: OBSTETRICS AND GYNECOLOGY | Facility: CLINIC | Age: 20
End: 2025-06-02
Payer: COMMERCIAL

## 2025-06-02 VITALS — WEIGHT: 185 LBS | SYSTOLIC BLOOD PRESSURE: 140 MMHG | BODY MASS INDEX: 28.98 KG/M2 | DIASTOLIC BLOOD PRESSURE: 90 MMHG

## 2025-06-02 DIAGNOSIS — R03.0 ELEVATED BP WITHOUT DIAGNOSIS OF HYPERTENSION: ICD-10-CM

## 2025-06-02 DIAGNOSIS — Z3A.12 12 WEEKS GESTATION OF PREGNANCY: ICD-10-CM

## 2025-06-02 DIAGNOSIS — O12.11 PROTEINURIA AFFECTING PREGNANCY IN FIRST TRIMESTER: ICD-10-CM

## 2025-06-02 DIAGNOSIS — Z34.00 PRENATAL CARE, FIRST PREGNANCY, ANTEPARTUM: Primary | ICD-10-CM

## 2025-06-02 PROBLEM — N92.6 IRREGULAR MENSTRUAL CYCLE: Status: RESOLVED | Noted: 2022-05-25 | Resolved: 2025-06-02

## 2025-06-02 PROBLEM — O12.10 PROTEINURIA AFFECTING PREGNANCY: Status: ACTIVE | Noted: 2025-06-02

## 2025-06-02 LAB
EXPIRATION DATE: 0
GLUCOSE UR STRIP-MCNC: NEGATIVE MG/DL
Lab: 0
PROT UR STRIP-MCNC: ABNORMAL MG/DL

## 2025-06-02 RX ORDER — PRENATAL VIT NO.126/IRON/FOLIC 28MG-0.8MG
TABLET ORAL DAILY
COMMUNITY

## 2025-06-02 RX ORDER — ASPIRIN 81 MG/1
81 TABLET ORAL DAILY
Qty: 90 TABLET | Refills: 3 | Status: SHIPPED | OUTPATIENT
Start: 2025-06-02

## 2025-06-02 NOTE — PROGRESS NOTES
OB FOLLOW UP  CC- Here for care of pregnancy        Pallavi Carmona is a 20 y.o.  12w4d patient being seen today for her obstetrical follow up visit. Patient reports occasional headaches (same frequency as pre-pregnancy and Tylenol helps; denies vision changes), intermittent nausea/vomiting (vitamin B6 and unisom helping), and mild intermittent heartburn (resolves on its own). Patient reports urine sample given was 3rd void of day.    Her prenatal care is complicated by (and status) :   Patient Active Problem List   Diagnosis    Encounter for surveillance of contraceptive pills    Headache in pregnancy    Elevated BP without diagnosis of hypertension    Proteinuria affecting pregnancy       Genetic testing?: desires with gender.  NOB labs reviewed  Flu Status: not currently flu season  Ultrasound Today: No    ROS -   Patient Denies: leaking of fluid, vaginal bleeding, dysuria, and more than 6 contractions per hour  Other than what is documented in the HPI, all other systems reviewed and are negative.     The additional following portions of the patient's history were reviewed and updated as appropriate: allergies, current medications, past family history, past medical history, past social history, past surgical history, and problem list.    I have reviewed and agree with the HPI, ROS, and historical information as entered above. Johnny Meyer MD         /90   Wt 83.9 kg (185 lb)   LMP 2025 (Exact Date)   BMI 28.98 kg/m²         EXAM:     Prenatal Vitals  BP: 140/90  Weight: 83.9 kg (185 lb)              Urine Glucose Read-only: Negative  Urine Protein Read-only: (!) 30 mg/dL       Assessment and Plan    Problem List Items Addressed This Visit       Elevated BP without diagnosis of hypertension    Relevant Medications    aspirin 81 MG EC tablet    Proteinuria affecting pregnancy    Relevant Orders    Protein, Urine, 24 Hour - Urine, Clean Catch     Other Visit Diagnoses         Prenatal  care, first pregnancy, antepartum    -  Primary    Relevant Orders    POC Protein, Urine, Qualitative, Dipstick (Completed)    POC Glucose, Urine, Qualitative, Dipstick (Completed)    [id number] PLUS Core+SCA+ESS - Blood,      12 weeks gestation of pregnancy        Relevant Orders    POC Protein, Urine, Qualitative, Dipstick (Completed)    POC Glucose, Urine, Qualitative, Dipstick (Completed)    [id number] PLUS Core+SCA+ESS - Blood,            Pregnancy at 12w4d  Labs reviewed from New OB Visit.  Counseled on genetic testing, carrier status and option for NT screen  Activity and Exercise discussed.  Patient is on Prenatal vitamins  Discussed bASA for PIH prevention from 12 to 36wk  Discussed new onset proteinuria with the patient.  Patient with a headache and mild range blood pressure today.  Discussed is not possible develop preeclampsia this early, but would like to establish whether or not she has chronic hypertension as well as assess for baseline proteinuria.  Proceed with 24-hour urine as well as repeat blood pressure check next week.  Return in about 1 week (around 6/9/2025) for BP Check .    Johnny Meyer MD  06/02/2025

## 2025-06-06 LAB
5P15 DELETION (CRI-DU-CHAT): NOT DETECTED
CFDNA.FET/CFDNA.TOTAL SFR FETUS: NORMAL %
CITATION REF LAB TEST: NORMAL
FET 13+18+21+X+Y ANEUP PLAS.CFDNA: NEGATIVE
FET 1P36 DEL RISK WBC.DNA+CFDNA QL: NOT DETECTED
FET 22Q11.2 DEL RISK WBC.DNA+CFDNA QL: NOT DETECTED
FET CHR 11Q23 DEL PLAS.CFDNA QL: NOT DETECTED
FET CHR 15Q11 DEL PLAS.CFDNA QL: NOT DETECTED
FET CHR 21 TS PLAS.CFDNA QL: NEGATIVE
FET CHR 4P16 DEL PLAS.CFDNA QL: NOT DETECTED
FET CHR 8Q24 DEL PLAS.CFDNA QL: NOT DETECTED
FET MS X RISK WBC.DNA+CFDNA QL: NOT DETECTED
FET SEX PLAS.CFDNA DOSAGE CFDNA: NORMAL
FET TS 13 RISK PLAS.CFDNA QL: NEGATIVE
FET TS 18 RISK WBC.DNA+CFDNA QL: NEGATIVE
FET X + Y ANEUP RISK PLAS.CFDNA SEQ-IMP: NOT DETECTED
GA EST FROM CONCEPTION DATE: NORMAL D
GESTATIONAL AGE > 9:: YES
LAB DIRECTOR NAME PROVIDER: NORMAL
LAB DIRECTOR NAME PROVIDER: NORMAL
LABORATORY COMMENT REPORT: NORMAL
LIMITATIONS OF THE TEST: NORMAL
NEGATIVE PREDICTIVE VALUE: NORMAL
PERFORMANCE CHARACTERISTICS: NORMAL
POSITIVE PREDICTIVE VALUE: NORMAL
REF LAB TEST METHOD: NORMAL
SERVICE CMNT-IMP: NORMAL
TEST PERFORMANCE INFO SPEC: NORMAL
TRIOSOMY 16: NOT DETECTED
TRISOMY 22: NOT DETECTED

## 2025-06-09 ENCOUNTER — ROUTINE PRENATAL (OUTPATIENT)
Dept: OBSTETRICS AND GYNECOLOGY | Facility: CLINIC | Age: 20
End: 2025-06-09
Payer: COMMERCIAL

## 2025-06-09 VITALS — WEIGHT: 182.2 LBS | SYSTOLIC BLOOD PRESSURE: 108 MMHG | DIASTOLIC BLOOD PRESSURE: 78 MMHG | BODY MASS INDEX: 28.54 KG/M2

## 2025-06-09 DIAGNOSIS — Z34.90 PRENATAL CARE, ANTEPARTUM, UNSPECIFIED GRAVIDITY: Primary | ICD-10-CM

## 2025-06-09 DIAGNOSIS — R03.0 ELEVATED BP WITHOUT DIAGNOSIS OF HYPERTENSION: ICD-10-CM

## 2025-06-09 DIAGNOSIS — O12.11 PROTEINURIA AFFECTING PREGNANCY IN FIRST TRIMESTER: ICD-10-CM

## 2025-06-09 LAB
GLUCOSE UR STRIP-MCNC: NEGATIVE MG/DL
PROT UR STRIP-MCNC: NEGATIVE MG/DL

## 2025-06-09 NOTE — PROGRESS NOTES
OB FOLLOW UP  CC- Here for care of pregnancy        Pallavi Carmona is a 20 y.o.  13w4d patient being seen today for her obstetrical follow up visit. Patient reports that she has headaches that are relieved by Tylenol. Denies vision changes. Reports that she had frequent headaches before pregnancy.     Her prenatal care is complicated by (and status) :   Patient Active Problem List   Diagnosis    Encounter for surveillance of contraceptive pills    Headache in pregnancy    Elevated BP without diagnosis of hypertension    Proteinuria affecting pregnancy       Genetic testing?: already completed and was normal.  NOB labs reviewed  Ultrasound Today: No    ROS -   Patient Denies: leaking of fluid, vaginal bleeding, dysuria, excessive vomiting, and more than 6 contractions per hour    Other than what is documented in the HPI, all other systems reviewed and are negative.     The additional following portions of the patient's history were reviewed and updated as appropriate: allergies, current medications, past family history, past medical history, past social history, past surgical history, and problem list.    I have reviewed and agree with the HPI, ROS, and historical information as entered above. Johnny Meyer MD         /78   Wt 82.6 kg (182 lb 3.2 oz)   LMP 2025 (Exact Date)   BMI 28.54 kg/m²         EXAM:     Prenatal Vitals  BP: 108/78  Weight: 82.6 kg (182 lb 3.2 oz)              Urine Glucose Read-only: Negative  Urine Protein Read-only: Negative       Assessment and Plan    Problem List Items Addressed This Visit       Elevated BP without diagnosis of hypertension    Relevant Medications    aspirin 81 MG EC tablet    Proteinuria affecting pregnancy     Other Visit Diagnoses         Prenatal care, antepartum, unspecified     -  Primary    Relevant Orders    POC Urinalysis Dipstick (Completed)            Pregnancy at 13w4d  Labs reviewed from New OB Visit.  Counseled on genetic  testing, carrier status and option for NT screen  Activity and Exercise discussed.  Patient is on Prenatal vitamins  Blood pressure normalized today.  Follow-up 24-hour urine result given proteinuria found last week although urine dip negative  Return in about 4 weeks (around 7/7/2025) for Prenatal Care.    Johnny Meyer MD  06/09/2025

## 2025-06-10 LAB
PROT 24H UR-MRATE: 2.5 MG/24HOURS (ref 0–150)
PROT UR-MCNC: 10.4 MG/DL

## 2025-07-07 ENCOUNTER — ROUTINE PRENATAL (OUTPATIENT)
Dept: OBSTETRICS AND GYNECOLOGY | Facility: CLINIC | Age: 20
End: 2025-07-07
Payer: COMMERCIAL

## 2025-07-07 VITALS — SYSTOLIC BLOOD PRESSURE: 132 MMHG | WEIGHT: 180.8 LBS | BODY MASS INDEX: 28.32 KG/M2 | DIASTOLIC BLOOD PRESSURE: 80 MMHG

## 2025-07-07 DIAGNOSIS — O10.919 CHRONIC HYPERTENSION AFFECTING PREGNANCY: ICD-10-CM

## 2025-07-07 DIAGNOSIS — Z34.90 PRENATAL CARE, ANTEPARTUM, UNSPECIFIED GRAVIDITY: Primary | ICD-10-CM

## 2025-07-07 PROBLEM — Z30.41 ENCOUNTER FOR SURVEILLANCE OF CONTRACEPTIVE PILLS: Status: RESOLVED | Noted: 2022-05-25 | Resolved: 2025-07-07

## 2025-07-07 LAB
GLUCOSE UR STRIP-MCNC: NEGATIVE MG/DL
PROT UR STRIP-MCNC: NEGATIVE MG/DL

## 2025-07-07 PROCEDURE — 0502F SUBSEQUENT PRENATAL CARE: CPT | Performed by: OBSTETRICS & GYNECOLOGY

## 2025-07-07 NOTE — PROGRESS NOTES
OB FOLLOW UP  CC- Here for care of pregnancy        Pallavi Carmona is a 20 y.o.  17w4d patient being seen today for her obstetrical follow up visit. Patient reports no complaints    Her prenatal care is complicated by (and status) : see below.  Patient Active Problem List   Diagnosis    Headache in pregnancy    Chronic hypertension affecting pregnancy    Proteinuria affecting pregnancy       Ultrasound Today: No    AFP: desires    ROS -   Patient Denies: leaking of fluid, vaginal bleeding, dysuria, excessive vomiting, and more than 6 contractions per hour  Fetal Movement: present  Other than what is documented in the HPI, all other systems reviewed and are negative.       The additional following portions of the patient's history were reviewed and updated as appropriate: allergies, current medications, past family history, past medical history, past social history, past surgical history, and problem list.      I have reviewed and agree with the HPI, ROS, and historical information as entered above. Johnny Meyer MD         EXAM:     Prenatal Vitals  BP: 132/80  Weight: 82 kg (180 lb 12.8 oz)   Fetal Heart Rate: 160         Urine Glucose Read-only: Negative  Urine Protein Read-only: Negative           Assessment and Plan    Problem List Items Addressed This Visit       Chronic hypertension affecting pregnancy    Relevant Medications    aspirin 81 MG EC tablet     Other Visit Diagnoses         Prenatal care, antepartum, unspecified     -  Primary    Relevant Orders    POC Urinalysis Dipstick (Completed)    Alpha Fetoprotein, Maternal            Pregnancy at 17w4d  Fetal status reassuring.   Counseled on MSAFP alone in relation to OTD and placental issues.    Anatomy scan next visit.   Activity and Exercise discussed.  Patient is on Prenatal vitamins  Discussed bASA for PIH prevention from 12 to 36wk  Return in about 3 weeks (around 2025) for Prenatal Care with SONO.    Johnny Meyer  MD  07/07/2025

## 2025-07-09 LAB
AFP INTERP SERPL-IMP: NORMAL
AFP INTERP SERPL-IMP: NORMAL
AFP MOM SERPL: 1.46
AFP SERPL QL: NORMAL
AFP SERPL-MCNC: 53.3 NG/ML
AGE AT DELIVERY: 22.5 YR
GA METHOD: NORMAL
GA: 17.6 WEEKS
IDDM PATIENT QL: NO
MULTIPLE PREGNANCY: NO
NEURAL TUBE DEFECT RISK FETUS: 3048 %
SERVICE CMNT-IMP: NORMAL

## 2025-07-29 DIAGNOSIS — Z34.92 SECOND TRIMESTER PREGNANCY: Primary | ICD-10-CM

## 2025-08-01 ENCOUNTER — ROUTINE PRENATAL (OUTPATIENT)
Dept: OBSTETRICS AND GYNECOLOGY | Facility: CLINIC | Age: 20
End: 2025-08-01
Payer: COMMERCIAL

## 2025-08-01 VITALS — WEIGHT: 185 LBS | SYSTOLIC BLOOD PRESSURE: 132 MMHG | BODY MASS INDEX: 28.98 KG/M2 | DIASTOLIC BLOOD PRESSURE: 78 MMHG

## 2025-08-01 DIAGNOSIS — O10.919 CHRONIC HYPERTENSION AFFECTING PREGNANCY: ICD-10-CM

## 2025-08-01 DIAGNOSIS — Z34.92 PRENATAL CARE, SECOND TRIMESTER: Primary | ICD-10-CM

## 2025-08-01 DIAGNOSIS — Z3A.20 20 WEEKS GESTATION OF PREGNANCY: ICD-10-CM

## 2025-08-01 LAB
EXPIRATION DATE: 0
GLUCOSE UR STRIP-MCNC: NEGATIVE MG/DL
Lab: 0
PROT UR STRIP-MCNC: NEGATIVE MG/DL

## 2025-08-01 RX ORDER — AMOXICILLIN 500 MG/1
CAPSULE ORAL
COMMUNITY
Start: 2025-07-31

## 2025-08-01 NOTE — PROGRESS NOTES
OB FOLLOW UP  CC- Here for care of pregnancy        Pallavi Carmona is a 20 y.o.  21w1d patient being seen today for her obstetrical follow up visit. Patient reports nausea/vomiting (emeses 5-6x weekly; occurs after eating, then resolves) and daily heartburn/reflux (prilosec helps). Declines nausea medication. Patient is currently taking amoxicillin for a tooth infection; she is getting a root canal in 2 weeks.    Her prenatal care is complicated by (and status): see below.  Patient Active Problem List   Diagnosis    Headache in pregnancy    Chronic hypertension affecting pregnancy    Proteinuria affecting pregnancy       Flu Status: not currently flu season  Ultrasound Today: Yes, for fetal anatomy  AFP was already completed and was normal.    ROS -     Patient Denies: leaking of fluid, vaginal bleeding, dysuria, excessive vomiting, and more than 6 contractions per hour  Fetal Movement: Yes  Other than what is documented in the HPI, all other systems reviewed and are negative.       The additional following portions of the patient's history were reviewed and updated as appropriate: allergies, current medications, past family history, past medical history, past social history, past surgical history, and problem list.      I have reviewed and agree with the HPI, ROS, and historical information as entered above. Johnny Meyer MD     /78   Wt 83.9 kg (185 lb)   LMP 2025 (Exact Date)   BMI 28.98 kg/m²       EXAM:     Prenatal Vitals  BP: 132/78  Weight: 83.9 kg (185 lb)   Fetal Heart Rate: 146 U/S          Urine Glucose Read-only: Negative  Urine Protein Read-only: Negative       Assessment and Plan    Problem List Items Addressed This Visit       Chronic hypertension affecting pregnancy    Relevant Medications    prenatal vitamin (prenatal, CLASSIC, vitamin) tablet    aspirin 81 MG EC tablet     Other Visit Diagnoses         Prenatal care, second trimester    -  Primary    Relevant Medications     Acetaminophen (TYLENOL PO)    Other Relevant Orders    POC Protein, Urine, Qualitative, Dipstick (Completed)    POC Glucose, Urine, Qualitative, Dipstick (Completed)      20 weeks gestation of pregnancy        Relevant Medications    Acetaminophen (TYLENOL PO)    Other Relevant Orders    POC Protein, Urine, Qualitative, Dipstick (Completed)    POC Glucose, Urine, Qualitative, Dipstick (Completed)            Pregnancy at 21w1d  Anatomy scan today is complete and appear within normal limits.  Fetal status reassuring.   Activity and Exercise discussed.  Patient is on Prenatal vitamins  Discussed bASA for PIH prevention from 12 to 36wk  Discussed reglan pre-meals for nausea   Return in about 4 weeks (around 8/29/2025) for Prenatal Care.    Johnny Meyer MD  08/01/2025

## 2025-08-29 ENCOUNTER — ROUTINE PRENATAL (OUTPATIENT)
Dept: OBSTETRICS AND GYNECOLOGY | Facility: CLINIC | Age: 20
End: 2025-08-29
Payer: COMMERCIAL

## 2025-08-29 VITALS — SYSTOLIC BLOOD PRESSURE: 122 MMHG | BODY MASS INDEX: 30.54 KG/M2 | DIASTOLIC BLOOD PRESSURE: 62 MMHG | WEIGHT: 195 LBS

## 2025-08-29 DIAGNOSIS — Z3A.25 25 WEEKS GESTATION OF PREGNANCY: Primary | ICD-10-CM

## 2025-08-29 DIAGNOSIS — O10.919 CHRONIC HYPERTENSION AFFECTING PREGNANCY: ICD-10-CM

## 2025-08-29 DIAGNOSIS — O26.899 PREGNANCY HEADACHE, ANTEPARTUM: ICD-10-CM

## 2025-08-29 DIAGNOSIS — O12.10 PROTEINURIA AFFECTING PREGNANCY, ANTEPARTUM: ICD-10-CM

## 2025-08-29 DIAGNOSIS — R51.9 PREGNANCY HEADACHE, ANTEPARTUM: ICD-10-CM

## 2025-08-29 LAB
GLUCOSE UR STRIP-MCNC: NEGATIVE MG/DL
PROT UR STRIP-MCNC: NEGATIVE MG/DL